# Patient Record
Sex: MALE | Race: WHITE | NOT HISPANIC OR LATINO | Employment: OTHER | URBAN - NONMETROPOLITAN AREA
[De-identification: names, ages, dates, MRNs, and addresses within clinical notes are randomized per-mention and may not be internally consistent; named-entity substitution may affect disease eponyms.]

---

## 2024-04-20 ENCOUNTER — HOSPITAL ENCOUNTER (EMERGENCY)
Facility: HOSPITAL | Age: 84
Discharge: HOME | End: 2024-04-20
Attending: FAMILY MEDICINE
Payer: MEDICARE

## 2024-04-20 VITALS
TEMPERATURE: 98 F | BODY MASS INDEX: 33.47 KG/M2 | DIASTOLIC BLOOD PRESSURE: 70 MMHG | SYSTOLIC BLOOD PRESSURE: 118 MMHG | HEIGHT: 69 IN | OXYGEN SATURATION: 98 % | WEIGHT: 226 LBS | RESPIRATION RATE: 18 BRPM | HEART RATE: 81 BPM

## 2024-04-20 DIAGNOSIS — M10.9 GOUT, UNSPECIFIED CAUSE, UNSPECIFIED CHRONICITY, UNSPECIFIED SITE: Primary | ICD-10-CM
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
## 2024-04-20 PROCEDURE — 63600175 PHARM REV CODE 636 W HCPCS: Performed by: FAMILY MEDICINE

## 2024-04-20 PROCEDURE — 96372 THER/PROPH/DIAG INJ SC/IM: CPT | Performed by: FAMILY MEDICINE

## 2024-04-20 PROCEDURE — 99284 EMERGENCY DEPT VISIT MOD MDM: CPT | Mod: 25

## 2024-04-20 RX ORDER — PREDNISONE 20 MG/1
20 TABLET ORAL DAILY
Qty: 3 TABLET | Refills: 0 | Status: SHIPPED | OUTPATIENT
Start: 2024-04-20 | End: 2024-04-23

## 2024-04-20 RX ORDER — INDOMETHACIN 25 MG/1
25 CAPSULE ORAL
Qty: 15 CAPSULE | Refills: 0 | Status: SHIPPED | OUTPATIENT
Start: 2024-04-20

## 2024-04-20 RX ORDER — KETOROLAC TROMETHAMINE 30 MG/ML
30 INJECTION, SOLUTION INTRAMUSCULAR; INTRAVENOUS
Status: COMPLETED | OUTPATIENT
Start: 2024-04-20 | End: 2024-04-20

## 2024-04-20 RX ORDER — COLCHICINE 0.6 MG/1
0.6 TABLET ORAL 2 TIMES DAILY
Qty: 3 TABLET | Refills: 0 | Status: SHIPPED | OUTPATIENT
Start: 2024-04-20 | End: 2024-04-23

## 2024-04-20 RX ADMIN — KETOROLAC TROMETHAMINE 30 MG: 30 INJECTION, SOLUTION INTRAMUSCULAR at 10:04

## 2024-04-20 NOTE — ED PROVIDER NOTES
Encounter Date: 4/20/2024       History       Chief Complaint  Patient presents with  · Arm Pain    [facility] WITH C/O LT. ARM/SHOULDER/WRIST PAIN ONSET YESTERDAY. PER KALYN CYR STATES OUTPATIENT X-RAY DONE YESTERDAY AND NEGATIVE FOR FX OR DISLOCATION patient has been complaining of pain in the left wrist which is very warm and tender to touch suspect gout x-rays of the shoulder and arm was negative I am going to repeat the x-ray of the wrist but considering the inflammatory nature of 1 involved joint put the gout into differential          Review of patient's allergies indicates:  No Known Allergies  Past Medical History:   Diagnosis Date    A-fib     CHF (congestive heart failure)     Depression     Diabetes mellitus     Flaccid hemiplegia affecting nondominant side     LT    PATRICIA (generalized anxiety disorder)     Hypertension      No past surgical history on file.  No family history on file.  Social History     Tobacco Use    Smoking status: Never    Smokeless tobacco: Never   Substance Use Topics    Drug use: Never     Review of Systems   Constitutional:  Negative for activity change, appetite change, diaphoresis and fatigue.   HENT:  Negative for congestion, ear discharge, ear pain, hearing loss, postnasal drip, sinus pain and sore throat.    Eyes:  Negative for pain.   Respiratory:  Negative for apnea, choking and stridor.    Cardiovascular:  Negative for palpitations.   Gastrointestinal:  Negative for abdominal distention, abdominal pain, constipation and diarrhea.   Endocrine: Negative for cold intolerance and heat intolerance.   Genitourinary:  Negative for difficulty urinating, enuresis, frequency, penile discharge, penile pain, scrotal swelling and testicular pain.   Musculoskeletal:  Positive for arthralgias, gait problem and joint swelling.        Left wrist warm and tender to touch   Neurological:  Negative for dizziness, seizures, syncope, facial asymmetry,  light-headedness and headaches.   Hematological:  Negative for adenopathy.   Psychiatric/Behavioral:  Negative for agitation, behavioral problems, decreased concentration and hallucinations. The patient is not hyperactive.        Physical Exam     Initial Vitals [04/20/24 1011]   BP Pulse Resp Temp SpO2   120/76 81 18 98.1 °F (36.7 °C) 98 %      MAP       --         Physical Exam    Nursing note and vitals reviewed.  Constitutional: He appears well-developed.   HENT:   Head: Normocephalic.   Eyes: Pupils are equal, round, and reactive to light.   Neck:   Normal range of motion.  Cardiovascular:  Normal rate, regular rhythm, normal heart sounds and intact distal pulses.           Pulmonary/Chest: No respiratory distress. He has no wheezes. He has no rales.   Abdominal: He exhibits no mass. There is no abdominal tenderness. There is no rebound and no guarding.   Musculoskeletal:         General: Normal range of motion.      Cervical back: Normal range of motion.      Comments: Left wrist warm and tender to touch suspect gout     Neurological: He is alert.   Skin: Skin is warm.   Psychiatric: He has a normal mood and affect. Thought content normal.         ED Course   Procedures  Labs Reviewed - No data to display       Imaging Results              X-Ray Wrist Complete Left (In process)                   X-Rays:   Independently Interpreted Readings:   Other Readings:  No acute fracture identified    Medications   ketorolac injection 30 mg (30 mg Intramuscular Given 4/20/24 1033)     Medical Decision Making  Considering monoarticular involvement and the joint is tender and warm to touch suspect gout I am going to give him a trial of colchicine steroids and indomethacin for pain    Amount and/or Complexity of Data Reviewed  Radiology: ordered.    Risk  Prescription drug management.                                      Clinical Impression:  Final diagnoses:  [W19.XXXA] Fall  [W19.XXXA] Fall - FALL X1 WEEK AGO  [M10.9]  Gout, unspecified cause, unspecified chronicity, unspecified site (Primary)          ED Disposition Condition    Discharge Stable          ED Prescriptions       Medication Sig Dispense Start Date End Date Auth. Provider    colchicine (COLCRYS) 0.6 mg tablet Take 1 tablet (0.6 mg total) by mouth 2 (two) times daily. for 3 days 3 tablet 4/20/2024 4/23/2024 Birgit Garland MD    predniSONE (DELTASONE) 20 MG tablet Take 1 tablet (20 mg total) by mouth once daily. for 3 days 3 tablet 4/20/2024 4/23/2024 Birgit Garland MD    indomethacin (INDOCIN) 25 MG capsule Take 1 capsule (25 mg total) by mouth 3 (three) times daily with meals. 15 capsule 4/20/2024 -- Birgit Garland MD          Follow-up Information       Follow up With Specialties Details Why Contact Info    Scarlet Bone MD Internal Medicine In 1 day  [address] [place] [address] [id number] [phone]               Birgit Garland MD  04/20/24 4469

## 2024-04-22 ENCOUNTER — OFFICE VISIT (OUTPATIENT)
Dept: NEUROLOGY | Facility: CLINIC | Age: 84
End: 2024-04-22
Payer: MEDICARE

## 2024-04-22 VITALS
DIASTOLIC BLOOD PRESSURE: 95 MMHG | HEIGHT: 69 IN | WEIGHT: 225 LBS | SYSTOLIC BLOOD PRESSURE: 163 MMHG | BODY MASS INDEX: 33.33 KG/M2

## 2024-04-22 DIAGNOSIS — I67.82 CHRONIC CEREBRAL ISCHEMIA: Primary | ICD-10-CM

## 2024-04-22 PROCEDURE — 99999 PR PBB SHADOW E&M-EST. PATIENT-LVL III: CPT | Mod: PBBFAC,,, | Performed by: SPECIALIST

## 2024-04-22 PROCEDURE — 1159F MED LIST DOCD IN RCRD: CPT | Mod: CPTII,S$GLB,, | Performed by: SPECIALIST

## 2024-04-22 PROCEDURE — 99214 OFFICE O/P EST MOD 30 MIN: CPT | Mod: S$GLB,,, | Performed by: SPECIALIST

## 2024-04-22 RX ORDER — APIXABAN 5 MG/1
5 TABLET, FILM COATED ORAL 2 TIMES DAILY
COMMUNITY
Start: 2024-04-11

## 2024-04-22 RX ORDER — FLECAINIDE ACETATE 100 MG/1
TABLET ORAL
COMMUNITY

## 2024-04-22 RX ORDER — FUROSEMIDE 20 MG/1
20 TABLET ORAL
COMMUNITY
Start: 2024-04-11

## 2024-04-22 RX ORDER — UBIDECARENONE 30 MG
30 CAPSULE ORAL 3 TIMES DAILY
COMMUNITY

## 2024-04-22 RX ORDER — LISINOPRIL 2.5 MG/1
2.5 TABLET ORAL
COMMUNITY

## 2024-04-22 RX ORDER — NAPROXEN SODIUM 220 MG/1
81 TABLET, FILM COATED ORAL DAILY
Start: 2024-04-22

## 2024-04-22 RX ORDER — CHOLECALCIFEROL (VITAMIN D3) 25 MCG
1000 TABLET ORAL DAILY
COMMUNITY

## 2024-04-22 RX ORDER — DICLOFENAC SODIUM 10 MG/G
GEL TOPICAL
COMMUNITY
Start: 2024-04-09

## 2024-04-22 RX ORDER — CYCLOBENZAPRINE HCL 10 MG
10 TABLET ORAL 3 TIMES DAILY
COMMUNITY
Start: 2024-04-09

## 2024-04-22 RX ORDER — VIT A/VIT C/VIT E/ZINC/COPPER 4296-226
CAPSULE ORAL
COMMUNITY

## 2024-04-22 RX ORDER — GABAPENTIN 100 MG/1
CAPSULE ORAL 2 TIMES DAILY
COMMUNITY

## 2024-04-22 RX ORDER — FLUOXETINE HYDROCHLORIDE 20 MG/1
CAPSULE ORAL
COMMUNITY
Start: 2024-04-09

## 2024-04-22 NOTE — PROGRESS NOTES
"Subjective:         Patient ID: Flaquito Braswell Jr. is a 84 y.o. male.    Chief Complaint: stroke fu from [facility]     HPI:         [facility] F/U Stroke.  (Left arm and leg paralysis. Unable to walk. Doing PT qd and it is helping. Lives in [facility] in [place]. Pts caretaker (Rhonda Toby) mi here today.  )      ...  notes may also be on facesheet for HPI, ROS, and other sections   ROS:             Current Outpatient Medications   Medication Instructions    aspirin 81 mg, Oral, Daily    co-enzyme Q-10 30 mg, Oral, 3 times daily    colchicine (COLCRYS) 0.6 mg, Oral, 2 times daily    cyanocobalamin/folic ac/vit B6 (FOLBIC ORAL) Oral    cyclobenzaprine (FLEXERIL) 10 mg, Oral, 3 times daily    diclofenac sodium (VOLTAREN) 1 % Gel SMARTSI Topical Twice Daily    ELIQUIS 5 mg, Oral, 2 times daily    flecainide (TAMBOCOR) 100 MG Tab Oral    FLUoxetine 20 MG capsule Oral    furosemide (LASIX) 20 mg, Oral    gabapentin (NEURONTIN) 100 MG capsule Oral, 2 times daily    indomethacin (INDOCIN) 25 mg, Oral, 3 times daily with meals    lisinopriL (PRINIVIL,ZESTRIL) 2.5 mg, Oral    predniSONE (DELTASONE) 20 mg, Oral, Daily    SAW PALMETTO ORAL saw palmetto Take No date recorded No form recorded No frequency recorded No route recorded No set duration recorded No set duration amount recorded active No dosage strength recorded No dosage strength units of measure recorded    vitamin D (VITAMIN D3) 1,000 Units, Oral, Daily    vitamins A,C,E-zinc-copper (ICAPS AREDS) 4,296 mcg-226 mg-90 mg Cap Oral      Objective:      Exam  BP (!) 163/95   Ht 5' 9" (1.753 m)   Wt 102.1 kg (225 lb)   BMI 33.23 kg/m²   General:   If Accompanied, by__ cg  heart:   pharynx:  Neurological   Speech: Ok   vis fields:    EOMs:    funduscopic:   Motor:     coord:     Gait:  Wc     Neuroimaging:  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 with possib of recurr seiz's  [] Cerebrovasc ds with risk of recurrent stroke  [] CNS meds (and/or) potentially high risk non CNS meds taken or discussed which may cause med or behav SE's  [] Fall risk [] Driving discussed  [] Diagnosis unclear or DDx wide making risk uncertain   []:    MDM:    [] High     [x] Moderate         Assessment/Plan:         ICD-10-CM ICD-9-CM   1. Chronic cerebral ischemia  I67.82 437.1     Other comments/ follow up:          Medications Ordered This Encounter   Medications    aspirin 81 MG Chew     Sig: Take 1 tablet (81 mg total) by mouth once daily.     Patient Instructions    Add aspirin 81mg to eliquis   Fu here PRN     Edward Hernandez MD YELENA FAAN FAASM